# Patient Record
Sex: MALE | Race: OTHER | HISPANIC OR LATINO | Employment: UNEMPLOYED | URBAN - METROPOLITAN AREA
[De-identification: names, ages, dates, MRNs, and addresses within clinical notes are randomized per-mention and may not be internally consistent; named-entity substitution may affect disease eponyms.]

---

## 2021-05-17 ENCOUNTER — HOSPITAL ENCOUNTER (EMERGENCY)
Facility: HOSPITAL | Age: 66
Discharge: HOME | End: 2021-05-17
Attending: EMERGENCY MEDICINE | Admitting: EMERGENCY MEDICINE

## 2021-05-17 ENCOUNTER — APPOINTMENT (EMERGENCY)
Dept: NON INVASIVE DIAGNOSTICS | Facility: HOSPITAL | Age: 66
End: 2021-05-17

## 2021-05-17 ENCOUNTER — APPOINTMENT (EMERGENCY)
Dept: RADIOLOGY | Facility: HOSPITAL | Age: 66
End: 2021-05-17

## 2021-05-17 VITALS
DIASTOLIC BLOOD PRESSURE: 82 MMHG | TEMPERATURE: 98.2 F | WEIGHT: 160 LBS | HEART RATE: 62 BPM | RESPIRATION RATE: 18 BRPM | OXYGEN SATURATION: 99 % | SYSTOLIC BLOOD PRESSURE: 148 MMHG

## 2021-05-17 DIAGNOSIS — M79.609 LIMB PAIN: ICD-10-CM

## 2021-05-17 DIAGNOSIS — M16.0 BILATERAL HIP JOINT ARTHRITIS: Primary | ICD-10-CM

## 2021-05-17 LAB
ALBUMIN SERPL BCP-MCNC: 3.6 G/DL (ref 3.5–5)
ALP SERPL-CCNC: 102 U/L (ref 46–116)
ALT SERPL W P-5'-P-CCNC: 41 U/L (ref 12–78)
ANION GAP SERPL CALCULATED.3IONS-SCNC: 8 MMOL/L (ref 4–13)
AST SERPL W P-5'-P-CCNC: 23 U/L (ref 5–45)
BASOPHILS # BLD AUTO: 0.08 THOUSANDS/ΜL (ref 0–0.1)
BASOPHILS NFR BLD AUTO: 1 % (ref 0–1)
BILIRUB SERPL-MCNC: 0.2 MG/DL (ref 0.2–1)
BUN SERPL-MCNC: 24 MG/DL (ref 5–25)
CALCIUM SERPL-MCNC: 8.7 MG/DL (ref 8.3–10.1)
CHLORIDE SERPL-SCNC: 107 MMOL/L (ref 100–108)
CO2 SERPL-SCNC: 27 MMOL/L (ref 21–32)
CREAT SERPL-MCNC: 0.93 MG/DL (ref 0.6–1.3)
D DIMER PPP FEU-MCNC: 1.05 UG/ML FEU
EOSINOPHIL # BLD AUTO: 2.66 THOUSAND/ΜL (ref 0–0.61)
EOSINOPHIL NFR BLD AUTO: 25 % (ref 0–6)
ERYTHROCYTE [DISTWIDTH] IN BLOOD BY AUTOMATED COUNT: 12.3 % (ref 11.6–15.1)
GFR SERPL CREATININE-BSD FRML MDRD: 86 ML/MIN/1.73SQ M
GLUCOSE SERPL-MCNC: 113 MG/DL (ref 65–140)
HCT VFR BLD AUTO: 39.6 % (ref 36.5–49.3)
HGB BLD-MCNC: 12.7 G/DL (ref 12–17)
IMM GRANULOCYTES # BLD AUTO: 0.04 THOUSAND/UL (ref 0–0.2)
IMM GRANULOCYTES NFR BLD AUTO: 0 % (ref 0–2)
LYMPHOCYTES # BLD AUTO: 2.49 THOUSANDS/ΜL (ref 0.6–4.47)
LYMPHOCYTES NFR BLD AUTO: 23 % (ref 14–44)
MCH RBC QN AUTO: 30.2 PG (ref 26.8–34.3)
MCHC RBC AUTO-ENTMCNC: 32.1 G/DL (ref 31.4–37.4)
MCV RBC AUTO: 94 FL (ref 82–98)
MONOCYTES # BLD AUTO: 0.66 THOUSAND/ΜL (ref 0.17–1.22)
MONOCYTES NFR BLD AUTO: 6 % (ref 4–12)
NEUTROPHILS # BLD AUTO: 4.69 THOUSANDS/ΜL (ref 1.85–7.62)
NEUTS SEG NFR BLD AUTO: 45 % (ref 43–75)
NRBC BLD AUTO-RTO: 0 /100 WBCS
PLATELET # BLD AUTO: 240 THOUSANDS/UL (ref 149–390)
PMV BLD AUTO: 10.5 FL (ref 8.9–12.7)
POTASSIUM SERPL-SCNC: 4.2 MMOL/L (ref 3.5–5.3)
PROT SERPL-MCNC: 7.7 G/DL (ref 6.4–8.2)
RBC # BLD AUTO: 4.21 MILLION/UL (ref 3.88–5.62)
SODIUM SERPL-SCNC: 142 MMOL/L (ref 136–145)
WBC # BLD AUTO: 10.62 THOUSAND/UL (ref 4.31–10.16)

## 2021-05-17 PROCEDURE — 36415 COLL VENOUS BLD VENIPUNCTURE: CPT | Performed by: EMERGENCY MEDICINE

## 2021-05-17 PROCEDURE — 99284 EMERGENCY DEPT VISIT MOD MDM: CPT

## 2021-05-17 PROCEDURE — 85379 FIBRIN DEGRADATION QUANT: CPT | Performed by: EMERGENCY MEDICINE

## 2021-05-17 PROCEDURE — 99284 EMERGENCY DEPT VISIT MOD MDM: CPT | Performed by: EMERGENCY MEDICINE

## 2021-05-17 PROCEDURE — 93970 EXTREMITY STUDY: CPT

## 2021-05-17 PROCEDURE — 73522 X-RAY EXAM HIPS BI 3-4 VIEWS: CPT

## 2021-05-17 PROCEDURE — 85025 COMPLETE CBC W/AUTO DIFF WBC: CPT | Performed by: EMERGENCY MEDICINE

## 2021-05-17 PROCEDURE — 80053 COMPREHEN METABOLIC PANEL: CPT | Performed by: EMERGENCY MEDICINE

## 2021-05-17 RX ORDER — ACETAMINOPHEN 325 MG/1
975 TABLET ORAL ONCE
Status: COMPLETED | OUTPATIENT
Start: 2021-05-17 | End: 2021-05-17

## 2021-05-17 RX ORDER — LIDOCAINE 50 MG/G
1 PATCH TOPICAL ONCE
Status: DISCONTINUED | OUTPATIENT
Start: 2021-05-17 | End: 2021-05-17 | Stop reason: HOSPADM

## 2021-05-17 RX ORDER — FINASTERIDE 1 MG/1
1 TABLET, FILM COATED ORAL DAILY
COMMUNITY

## 2021-05-17 RX ORDER — TAMSULOSIN HYDROCHLORIDE 0.4 MG/1
0.4 CAPSULE ORAL
COMMUNITY

## 2021-05-17 RX ORDER — IBUPROFEN 400 MG/1
400 TABLET ORAL ONCE
Status: COMPLETED | OUTPATIENT
Start: 2021-05-17 | End: 2021-05-17

## 2021-05-17 RX ADMIN — IBUPROFEN 400 MG: 400 TABLET ORAL at 16:02

## 2021-05-17 RX ADMIN — LIDOCAINE 1 PATCH: 50 PATCH TOPICAL at 16:02

## 2021-05-17 RX ADMIN — ACETAMINOPHEN 975 MG: 325 TABLET, FILM COATED ORAL at 16:02

## 2021-05-17 NOTE — DISCHARGE INSTRUCTIONS
Take 1000 mg of acetaminophen (Tylenol) with 400 mg of ibuprofen (Advil) every 8 hours as needed for pain  Lidocaine patches are available over-the-counter can be found in the back pain aisle of most pharmacies  Look for 4% lidocaine in the list of the active ingredients  These patches can be placed for 12 hours  After 12 hours, discard the patch  The next patch can be placed 12 hours later  Osteoarthritis   WHAT YOU NEED TO KNOW:   Osteoarthritis (OA) occurs when cartilage (tissue that cushions a joint) wears away slowly and causes the bones to rub together  OA is a long-term condition that often affects the hands, neck, lower back, knees, and hips  OA is also called arthrosis or degenerative joint disease  DISCHARGE INSTRUCTIONS:   Call your doctor or specialist if:   You have severe pain  You cannot move your joint  You have a fever  Your joint is red and tender  You have questions or concerns about your condition or care  Medicines: You may need any of the following:  Acetaminophen  decreases pain and fever  It is available without a doctor's order  Ask how much to take and how often to take it  Follow directions  Read the labels of all other medicines you are using to see if they also contain acetaminophen, or ask your doctor or pharmacist  Acetaminophen can cause liver damage if not taken correctly  Do not use more than 4 grams (4,000 milligrams) total of acetaminophen in one day  NSAIDs , such as ibuprofen, help decrease swelling, pain, and fever  This medicine is available with or without a doctor's order  NSAIDs can cause stomach bleeding or kidney problems in certain people  If you take blood thinner medicine, always ask your healthcare provider if NSAIDs are safe for you  Always read the medicine label and follow directions  Capsaicin cream  may help decrease pain in your joint  Prescription pain medicine  may be given   Ask your healthcare provider how to take this medicine safely  Some prescription pain medicines contain acetaminophen  Do not take other medicines that contain acetaminophen without talking to your healthcare provider  Too much acetaminophen may cause liver damage  Prescription pain medicine may cause constipation  Ask your healthcare provider how to prevent or treat constipation  Take your medicine as directed  Contact your healthcare provider if you think your medicine is not helping or if you have side effects  Tell him or her if you are allergic to any medicine  Keep a list of the medicines, vitamins, and herbs you take  Include the amounts, and when and why you take them  Bring the list or the pill bottles to follow-up visits  Carry your medicine list with you in case of an emergency  Follow up with your healthcare provider as directed:  Write down your questions so you remember to ask them during your visits  Go to physical therapy as directed:  A physical therapist teaches you exercises to help improve movement and strength, and to decrease pain in your joints  The exercises also help lower your risk for loss of function  A physical therapist may move an area with his or her hands  For example, he or she may move your leg in certain ways to treat osteoarthritis in your hip  Manage your symptoms:   Stay active  Physical activity may reduce your pain and improve your ability to do daily activities  Avoid activities that cause pain  Ask your healthcare provider what type of exercise would be best for you  Maintain a healthy weight  This helps decrease the strain on the joints in your back, hips, knees, ankles, and feet  Ask your healthcare provider what a healthy weight is for you  He or she can help you create a weight loss plan if you are overweight  Use heat or ice on your joints as directed  Heat and ice help decrease pain, swelling, and muscle spasms   For heat, use a heating pad on a low setting for 20 minutes, or take a warm bath  For ice, use an ice pack, or put crushed ice in a plastic bag  Cover it with a towel before you place it on your joint  Use ice for 15 minutes every hour  Massage the muscles around the joint  Massage helps relieve pain and stiffness  Your healthcare provider or a physical therapist can show you how to do this  If you have hip OA, another person may need to help you massage the area  Use a cane, crutches, or a walker if directed  These help protect and relieve pressure on your ankle, knee, and hip joints  You may also be prescribed shoe inserts to decrease pressure in your joints  Wear flat or low-heeled shoes  This will help decrease pain and reduce pressure on your ankle, knee, and hip joints  © Copyright [address] Information is for End User's use only and may not be sold, redistributed or otherwise used for commercial purposes  All illustrations and images included in CareNotes® are the copyrighted property of A D A M , Inc  or [address] [place], [place] [address]  The above information is an  only  It is not intended as medical advice for individual conditions or treatments  Talk to your doctor, nurse or pharmacist before following any medical regimen to see if it is safe and effective for you  Osteoartritis   LO QUE NECESITA SABER:   La osteoartritis ocurre cuando el cartílago (tejido que amortigua jan articulación) se desgasta lentamente y causa que los huesos rocen entre ellos  Esta enfermedad es jan afección a santa plazo que con frecuencia afecta las jimmy, loren, parte baja de la espalda, rodillas y caderas  La osteoartritis también se conoce alphonse artrosis o enfermedad degenerativa de las articulaciones  INSTRUCCIONES SOBRE EL [facility] HOSPITALARIA:   Llame a butterfield médico o especialista si:  Usted tiene dolor intenso  Usted no puede  la articulación  Tiene fiebre  Butterfield articulación Alicia Mariposa y sensible      Usted tiene preguntas o inquietudes acerca de butterfield condición o cuidado  Medicamentos: Es posible que usted necesite alguno de los siguientes:  Acetaminofén torrey el dolor y baja la fiebre  Está disponible sin receta médica  Pregunte la cantidad y la frecuencia con que debe tomarlos  [address]  Laura las etiquetas de todos los demás medicamentos que esté usando para saber si también contienen acetaminofén, o pregunte a butterfield médico o farmacéutico  El acetaminofén puede causar daño en el hígado cuando no se john de forma correcta  No use más de 4 gramos (4000 miligramos) en total de acetaminofeno en un día  Los [place], alphonse el [facility] a disminuir la inflamación, el dolor y la [place]  Chanel medicamento está disponible con o sin jan receta médica  Los BALJIT pueden causar sangrado estomacal o problemas renales en ciertas personas  Si usted john un medicamento anticoagulante, siempre pregúntele a butterfield médico si los BALJIT son seguros para usted  Siempre laura la etiqueta de chanel medicamento y [place] instrucciones  La crema de capsaicina puede ayudar a disminuir el dolor en butterfield articulación  Puede administrarse podrían administrarse  Pregunte al médico cómo debe andrew chanel medicamento de forma ko  Algunos medicamentos recetados para el dolor contienen acetaminofén  No tome otros medicamentos que contengan acetaminofén sin consultarlo con butterfield médico  Demasiado acetaminofeno puede causar daño al hígado  Los medicamentos recetados para el dolor podrían causar estreñimiento  Pregunte a butterfield médico alphonse prevenir o tratar estreñimiento  Horine lloyd medicamentos alphonse se le haya indicado  Consulte con butterfield médico si usted chey que butterfield medicamento no le está ayudando o si presenta efectos secundarios  Infórmele si es alérgico a cualquier medicamento  Mantenga jan lista actualizada de los [place], las vitaminas y los productos herbales que john  Incluya los siguientes datos de los medicamentos: cantidad, frecuencia y motivo de administración   Terrilee Cords con usted la lista o los envases de las píldoras a lloyd citas de seguimiento  Lleve la lista de los medicamentos con usted en latoya de jan emergencia  Acuda a lloyd consultas de control con butterfield médico según le indicaron  Anote lloyd preguntas para que se acuerde de hacerlas maye lloyd visitas  Vaya a fisioterapia según indicaciones: Un fisioterapeuta le enseñará los ejercicios para mejorar el movimiento y la [place], con el fin de disminuir el dolor en las articulaciones  Los ejercicios también ayudan a [employer] de pérdida de función  Un fisioterapeuta puede  un área con lloyd jimmy  Por ejemplo, puede  la pierna de ciertas maneras para tratar la osteoartritis en la cadera  [place] de lloyd síntomas:  Manténgase activo  La actividad física puede reducir el dolor y mejorar butterfield habilidad de hacer lloyd actividades diarias  Evite actividades que le causen Teressa Vamshi  Pregúntele a butterfield médico qué tipos de ejercicios son los adecuados para usted  Mantenga un peso saludable  [place] ayuda a disminuir la presión en las articulaciones en butterfield espalda, caderas, rodillas, tobillos y pies  Pregúntele a butterfield médico cuál es el peso ideal para usted  Puede ayudarlo a crear un plan saludable de pérdida de peso si usted tiene sobrepeso  Use calor o hielo en lloyd articulaciones según le hayan indicado  El calor o el hielo pueden ayudar a disminuir el dolor, la inflamación y los espasmos musculares  Para el calor, use jan almohadilla de calor eléctrica en temperatura baja maye 20 minutos o tome un baño tibio  Para el hielo, use un paquete de hielo o ponga hielo molido dentro de [employer]  Cúbrala con jan toalla antes de colocarla sobre la articulación  Aplique hielo maye 15 minutos cada hora  [place]ee los músculos alrededor de la articulación  Los masajes ayudan a aliviar el dolor y la rigidez  Butterfield médico o butterfield fisioterapeuta le puede indicar con qué frecuencia necesita hacerlos   Si usted tiene osteoartritis de cadera, es posible que otra persona necesite ayudarlo a masajear Stephanie Pichardo  Use un bastón, unas muletas o un caminador si se lo indican  Estos ayudan a proteger y aliviar la presión en choudhury Lizzie Faheem y articulaciones de la [place]  También le pueden ordenar plantillas ortopédicas para lloyd zapatos para disminuir la presión de lloyd articulaciones  Use zapatos sin tacones o de tacones bajos  [place] le servirá para aliviar el dolor y a disminuir la presión que se ejerce en las articulaciones de choudhury Lizzie Faheem y caderas  © [place] [address] Information is for End User's use only and may not be sold, redistributed or otherwise used for commercial purposes  All illustrations and images included in CareNotes® are the copyrighted property of [employer] [facility]  or [address] [place] [address] es sólo para uso en educación  Choudhury intención no es darle un consejo médico sobre enfermedades o tratamientos  Colsulte con choudhury Lyly Bourdon farmacéutico antes de seguir cualquier régimen médico para saber si es seguro y efectivo para usted

## 2021-05-17 NOTE — ED PROVIDER NOTES
History  Chief Complaint   Patient presents with    Leg Pain     Patient states that he has bilateral lower leg pain incrasingly worse over the past month     60year old male presents for evaluation of bilateral leg pain radiating over the feet which has worsened over the past month  Patient denies exacerbating or alleviating factors stating the pain is constant throughout the day and night  He has not taken anything for the pain today  Patient has not noticed any swelling of the legs  No history of blood clots  He denies any injury to the legs  History provided by:  Patient  Leg Pain  Location:  Hip  Hip location:  L hip and R hip  Pain details:     Quality:  Aching    Radiates to: down both legs to the bilateral feet  Severity:  Severe    Onset quality:  Gradual    Duration:  1 month    Timing:  Constant    Progression:  Worsening  Chronicity:  New  Prior injury to area:  No  Relieved by:  Nothing  Worsened by:  Nothing  Ineffective treatments:  Rest  Associated symptoms: no back pain, no fatigue and no fever        Prior to Admission Medications   Prescriptions Last Dose Informant Patient Reported? Taking?   finasteride (PROPECIA) 1 MG tablet   Yes Yes   Sig: Take 1 mg by mouth daily   tamsulosin (FLOMAX) 0 4 mg   Yes Yes   Sig: Take 0 4 mg by mouth daily with dinner      Facility-Administered Medications: None       History reviewed  No pertinent past medical history  History reviewed  No pertinent surgical history  History reviewed  No pertinent family history  I have reviewed and agree with the history as documented  E-Cigarette/Vaping     E-Cigarette/Vaping Substances     Social History     Tobacco Use    Smoking status: Former Smoker    Smokeless tobacco: Never Used   Substance Use Topics    Alcohol use: Not Currently    Drug use: Not Currently       Review of Systems   Constitutional: Negative for appetite change, diaphoresis, fatigue and fever     HENT: Negative for congestion and sore throat  Respiratory: Negative for cough and shortness of breath  Cardiovascular: Negative for chest pain and leg swelling  Gastrointestinal: Negative for abdominal pain, constipation, diarrhea, nausea and vomiting  Musculoskeletal: Negative for back pain and myalgias  Skin: Negative for rash and wound  Neurological: Negative for dizziness, syncope and headaches  All other systems reviewed and are negative  Physical Exam  Physical Exam  Vitals signs and nursing note reviewed  Constitutional:       General: He is not in acute distress  Appearance: He is well-developed  He is not toxic-appearing or diaphoretic  HENT:      Head: Normocephalic and atraumatic  Right Ear: External ear normal       Left Ear: External ear normal       Nose: Nose normal    Eyes:      General: No scleral icterus  Cardiovascular:      Rate and Rhythm: Normal rate and regular rhythm  Pulses: Normal pulses  Dorsalis pedis pulses are 2+ on the right side and 2+ on the left side  Posterior tibial pulses are 2+ on the right side and 2+ on the left side  Pulmonary:      Effort: Pulmonary effort is normal  No respiratory distress  Abdominal:      General: There is no distension  Musculoskeletal: Normal range of motion  General: No deformity  Comments: No midline C/T/L spine tenderness  No step offs or deformities  Tenderness along popliteal fossas bilaterally   Skin:     Capillary Refill: Capillary refill takes less than 2 seconds  Findings: No rash  Neurological:      General: No focal deficit present  Mental Status: He is alert and oriented to person, place, and time  Sensory: Sensation is intact  Motor: Motor function is intact     Psychiatric:         Mood and Affect: Mood normal          Vital Signs  ED Triage Vitals [05/17/21 1503]   Temperature Pulse Respirations Blood Pressure SpO2   98 2 °F (36 8 °C) 72 18 132/75 98 %      Temp Source Heart Rate Source Patient Position - Orthostatic VS BP Location FiO2 (%)   Temporal -- Lying Left arm --      Pain Score       --           Vitals:    05/17/21 1503 05/17/21 1615   BP: 132/75 148/82   Pulse: 72 62   Patient Position - Orthostatic VS: Lying          Visual Acuity      ED Medications  Medications   acetaminophen (TYLENOL) tablet 975 mg (975 mg Oral Given 5/17/21 1602)   ibuprofen (MOTRIN) tablet 400 mg (400 mg Oral Given 5/17/21 1602)       Diagnostic Studies  Results Reviewed     Procedure Component Value Units Date/Time    D-dimer, quantitative [[id number]]  (Abnormal) Collected: 05/17/21 1611    Lab Status: Final result Specimen: Blood from Arm, Left Updated: 05/17/21 1644     D-Dimer, Quant 1 05 ug/ml FEU     Comprehensive metabolic panel [[id number]] Collected: 05/17/21 1611    Lab Status: Final result Specimen: Blood from Arm, Left Updated: 05/17/21 1635     Sodium 142 mmol/L      Potassium 4 2 mmol/L      Chloride 107 mmol/L      CO2 27 mmol/L      ANION GAP 8 mmol/L      BUN 24 mg/dL      Creatinine 0 93 mg/dL      Glucose 113 mg/dL      Calcium 8 7 mg/dL      AST 23 U/L      ALT 41 U/L      Alkaline Phosphatase 102 U/L      Total Protein 7 7 g/dL      Albumin 3 6 g/dL      Total Bilirubin 0 20 mg/dL      eGFR 86 ml/min/1 73sq m     Narrative:      Brenda guidelines for Chronic Kidney Disease (CKD):     Stage 1 with normal or high GFR (GFR > 90 mL/min/1 73 square meters)    Stage 2 Mild CKD (GFR = 60-89 mL/min/1 73 square meters)    Stage 3A Moderate CKD (GFR = 45-59 mL/min/1 73 square meters)    Stage 3B Moderate CKD (GFR = 30-44 mL/min/1 73 square meters)    Stage 4 Severe CKD (GFR = 15-29 mL/min/1 73 square meters)    Stage 5 End Stage CKD (GFR <15 mL/min/1 73 square meters)  Note: GFR calculation is accurate only with a steady state creatinine    CBC and differential [[id number]]  (Abnormal) Collected: 05/17/21 1611    Lab Status: Final result Specimen: Blood from Arm, Left Updated: 05/17/21 1618     WBC 10 62 Thousand/uL      RBC 4 21 Million/uL      Hemoglobin 12 7 g/dL      Hematocrit 39 6 %      MCV 94 fL      MCH 30 2 pg      MCHC 32 1 g/dL      RDW 12 3 %      MPV 10 5 fL      Platelets 212 Thousands/uL      nRBC 0 /100 WBCs      Neutrophils Relative 45 %      Immat GRANS % 0 %      Lymphocytes Relative 23 %      Monocytes Relative 6 %      Eosinophils Relative 25 %      Basophils Relative 1 %      Neutrophils Absolute 4 69 Thousands/µL      Immature Grans Absolute 0 04 Thousand/uL      Lymphocytes Absolute 2 49 Thousands/µL      Monocytes Absolute 0 66 Thousand/µL      Eosinophils Absolute 2 66 Thousand/µL      Basophils Absolute 0 08 Thousands/µL                  VAS lower limb venous duplex study, complete bilateral   Final Result by Baron Marisa MD (05/18 1311)      XR hips bilateral 3-4 vw w pelvis if performed   ED Interpretation by Marya Aguilar MD (05/17 1344)   Degenerative changes  Questionable right acetabular fracture      Final Result by Janet Joshua MD (05/17 1607)      No acute osseous abnormality              Workstation performed: [id number]                    Procedures  Procedures         ED Course                                   Wells' Criteria for DVT      Most Recent Value   Wells' Criteria for DVT   Active cancer Treatment or palliation within 6 months  0 Filed at: 05/17/2021 1537   Bedridden recently >3 days or major surgery within 12 weeks  0 Filed at: 05/17/2021 1537   Calf swelling >3 cm compared to the other leg  0 Filed at: 05/17/2021 1537   Entire leg swollen  0 Filed at: 05/17/2021 1537   Collateral (nonvaricose) superficial veins present  1 Filed at: 05/17/2021 1537   Localized tenderness along the deep venous system  1 Filed at: 05/17/2021 1537   Pitting edema, confined to symptomatic leg  0 Filed at: 05/17/2021 1537   Paralysis, paresis, or recent plaster immobilization of the lower extremity  0 Filed at: 05/17/2021 1537 Previously documented DVT  0 Filed at: 05/17/2021 1537   Alternative diagnosis to DVT as likely or more likely  2 Filed at: 05/17/2021 1537   [place] DVT Critera Score  0 Filed at: 05/17/2021 1537              [facility]  Number of Diagnoses or Management Options  Bilateral hip joint arthritis: new and requires workup  Diagnosis management comments: 60year old male presents for evaluaton of bilateral hip pain radiating down the legs  Low risk Well's score for DVT  D-dimer positive  Vascular duplex pending  If negative, suspect pain is secondary to osteoarthritis which was visualized on xray  Patient signed out to Dr Magalys Suresh  Amount and/or Complexity of Data Reviewed  Clinical lab tests: ordered and reviewed  Tests in the radiology section of CPT®: ordered and reviewed  Independent visualization of images, tracings, or specimens: yes    Patient Progress  Patient progress: stable      Disposition  Final diagnoses:   Bilateral hip joint arthritis     Time reflects when diagnosis was documented in both MDM as applicable and the Disposition within this note     Time User Action Codes Description Comment    5/17/2021  4:10 PM Mai Dereje Add [M16 0] Bilateral hip joint arthritis     5/17/2021  6:02 PM Kory Golden Add [M79 609] Limb pain       ED Disposition     ED Disposition Condition Date/Time Comment    Discharge Stable Mon May 17, 2021  6:12 PM Sebastian Moses discharge to home/self care              Follow-up Information     Follow up With Specialties Details Why Contact Info Additional Information    [employer]fol[employer]  Call  for help finding a primary care doctor [phone]        [address] Emergency Department Emergency Medicine Go to  If symptoms worsen, numbness, weakness [address] [place], [id number] [phone] [address] Emergency Department, [address] [place] [address] Bharath 10          Discharge Medication List as of 5/17/2021  6:12 PM      CONTINUE these medications which have NOT CHANGED    Details   finasteride (PROPECIA) 1 MG tablet Take 1 mg by mouth daily, Historical Med      tamsulosin (FLOMAX) 0 4 mg Take 0 4 mg by mouth daily with dinner, Historical Med           No discharge procedures on file      PDMP Review     None          ED Provider  Electronically Signed by           Vanesa Osborne MD  05/19/21 7380

## 2021-05-17 NOTE — ED CARE HANDOFF
Emergency Department Sign Out Note        Sign out and transfer of care from Dr Martinez Ratliff  See Separate Emergency Department note  The patient, Angela Ramirez, was evaluated by the previous provider for bilateral leg pain  Workup Completed:  Preliminary Doppler report does not show any acute DVT    ED Course / Workup Pending (followup): Follow-up as an outpatient                        [place] Criteria for DVT      Most Recent Value   Shoaib' Criteria for DVT   Active cancer Treatment or palliation within 6 months  0 Filed at: 05/17/2021 1537   Bedridden recently >3 days or major surgery within 12 weeks  0 Filed at: 05/17/2021 1537   Calf swelling >3 cm compared to the other leg  0 Filed at: 05/17/2021 1537   Entire leg swollen  0 Filed at: 05/17/2021 1537   Collateral (nonvaricose) superficial veins present  1 Filed at: 05/17/2021 1537   Localized tenderness along the deep venous system  1 Filed at: 05/17/2021 1537   Pitting edema, confined to symptomatic leg  0 Filed at: 05/17/2021 1537   Paralysis, paresis, or recent plaster immobilization of the lower extremity  0 Filed at: 05/17/2021 1537   Previously documented DVT  0 Filed at: 05/17/2021 1537   Alternative diagnosis to DVT as likely or more likely  2 Filed at: 05/17/2021 1537   Shoaib DVT Critera Score  0 Filed at: 05/17/2021 1537                     Procedures  MDM    Disposition  Final diagnoses:   Bilateral hip joint arthritis     Time reflects when diagnosis was documented in both MDM as applicable and the Disposition within this note     Time User Action Codes Description Comment    5/17/2021  4:10 PM Sinai Wynn Add [M16 0] Bilateral hip joint arthritis     5/17/2021  6:02 PM D[place]ce [place] Add [M79 609] Limb pain       ED Disposition     ED Disposition Condition Date/Time Comment    Discharge Stable Mon May 17, 2021  6:12 PM Angela Ramirez discharge to home/self care              Follow-up Information     Follow up With Specialties Details Why Contact Info Additional Information    [facility]fol[facility]  Call  for help finding a primary care doctor [phone]        [address] Emergency Department Emergency Medicine Go to  If symptoms worsen, numbness, weakness [address] [place], [address] Emergency Department, [address] [place] [address] Bharath 10        Patient's Medications   Discharge Prescriptions    No medications on file     No discharge procedures on file         ED Provider  Electronically Signed by     Romayne Hatchet, DO  05/17/21 6572

## 2021-05-18 PROCEDURE — 93970 EXTREMITY STUDY: CPT | Performed by: SURGERY
